# Patient Record
Sex: FEMALE | Race: WHITE | URBAN - METROPOLITAN AREA
[De-identification: names, ages, dates, MRNs, and addresses within clinical notes are randomized per-mention and may not be internally consistent; named-entity substitution may affect disease eponyms.]

---

## 2018-10-25 ENCOUNTER — RADIANT APPOINTMENT (OUTPATIENT)
Dept: GENERAL RADIOLOGY | Facility: CLINIC | Age: 33
End: 2018-10-25
Attending: PHYSICIAN ASSISTANT

## 2018-10-25 ENCOUNTER — OFFICE VISIT (OUTPATIENT)
Dept: URGENT CARE | Facility: URGENT CARE | Age: 33
End: 2018-10-25

## 2018-10-25 VITALS
OXYGEN SATURATION: 98 % | HEART RATE: 109 BPM | WEIGHT: 119 LBS | DIASTOLIC BLOOD PRESSURE: 80 MMHG | TEMPERATURE: 101 F | SYSTOLIC BLOOD PRESSURE: 129 MMHG

## 2018-10-25 DIAGNOSIS — J18.9 COMMUNITY ACQUIRED PNEUMONIA OF LEFT LOWER LOBE OF LUNG: Primary | ICD-10-CM

## 2018-10-25 DIAGNOSIS — R50.9 FEVER, UNSPECIFIED FEVER CAUSE: ICD-10-CM

## 2018-10-25 LAB
FLUAV+FLUBV AG SPEC QL: NEGATIVE
FLUAV+FLUBV AG SPEC QL: NEGATIVE
SPECIMEN SOURCE: NORMAL

## 2018-10-25 PROCEDURE — 99203 OFFICE O/P NEW LOW 30 MIN: CPT | Performed by: PHYSICIAN ASSISTANT

## 2018-10-25 PROCEDURE — 71046 X-RAY EXAM CHEST 2 VIEWS: CPT | Mod: FY

## 2018-10-25 PROCEDURE — 87804 INFLUENZA ASSAY W/OPTIC: CPT | Performed by: PHYSICIAN ASSISTANT

## 2018-10-25 RX ORDER — AZITHROMYCIN 250 MG/1
TABLET, FILM COATED ORAL
Qty: 6 TABLET | Refills: 0 | Status: SHIPPED | OUTPATIENT
Start: 2018-10-25

## 2018-10-25 RX ORDER — ALBUTEROL SULFATE 90 UG/1
2 AEROSOL, METERED RESPIRATORY (INHALATION) EVERY 4 HOURS PRN
Qty: 1 INHALER | Refills: 0 | Status: SHIPPED | OUTPATIENT
Start: 2018-10-25

## 2018-10-25 NOTE — PROGRESS NOTES
"SUBJECTIVE:   Shannan Amanda is a 33 year old female presenting with a chief complaint of   Chief Complaint   Patient presents with     Fever     Fever 101-103 axillary x 5 days.  Cough, body aches, fatigue x 3 days.    .    URI Adult    Onset of symptoms was 5 day(s) ago.  Course of illness is same.    Severity moderately severe  Current and Associated symptoms: fever, cough - non-productive, cough - productive, headache and body aches. No nasal congestion or stuffy nose. Admits to mild sore throat. No nausea, vomiting. No dysuria, hematuria. No rashes.   States malaise is improving but cough is worsening. No wheezing or SOB, but \"feels like she has to take slow quick breaths because it makes her cough more.\"  Treatment measures tried include Tylenol/Ibuprofen and OTC Cough med.  Predisposing factors include ill contact: Family member .      ROS  See HPI    PMH:  Past Medical History:   Diagnosis Date     NO ACTIVE PROBLEMS      There is no problem list on file for this patient.      Current Medications:  Current Outpatient Prescriptions   Medication Sig Dispense Refill     albuterol (VENTOLIN HFA) 108 (90 Base) MCG/ACT inhaler Inhale 2 puffs into the lungs every 4 hours as needed for shortness of breath / dyspnea or wheezing 1 Inhaler 0     azithromycin (ZITHROMAX) 250 MG tablet Two tablets first day, then one tablet daily for four days. 6 tablet 0       Surgical History:  No past surgical history on file.    Family History:  No family history on file.    Social History:  Social History   Substance Use Topics     Smoking status: Not on file     Smokeless tobacco: Not on file     Alcohol use Not on file          OBJECTIVE  /80  Pulse 109  Temp 101  F (38.3  C) (Tympanic)  Wt 119 lb (54 kg)  SpO2 98%    General: alert, appears generally well, NAD. Febrile.  Skin: skin is damp, clammy.  HEENT: Normocephalic.   Eyes: conjunctiva clear.   Ears: TMs pearly, translucent bilaterally.   Nose: mild erythema " without edema of nasal mucosa. No rhinorrhea.  Oropharynx: MMM. Geographic tongue. + posterior pharyngeal erythema, no petechiae or exudate. No tonsillar hypertrophy. Uvula midline.    Neck: supple, no lymphadenopathy.  Respiratory: No distress. Frequent dry coughing when asked to inspire deeply. Coarse breath sounds throughout. No wheezing.   Cardiovascular: mildly tachycardic but normal S1/S2. No murmurs, clicks, gallups, or rub. No peripheral edema. Peripheral pulses 2+ bilaterally.  Gastrointestinal: Abdomen soft, nontender, BS present. No masses, organomegaly.        Labs:  Results for orders placed or performed in visit on 10/25/18 (from the past 24 hour(s))   Influenza A/B antigen   Result Value Ref Range    Influenza A/B Agn Specimen Nasal     Influenza A Negative NEG^Negative    Influenza B Negative NEG^Negative         X-Ray was done, my findings are: LLL infiltrate      ASSESSMENT/PLAN:    ICD-10-CM    1. Community acquired pneumonia of left lower lobe of lung (H) J18.1 azithromycin (ZITHROMAX) 250 MG tablet     albuterol (VENTOLIN HFA) 108 (90 Base) MCG/ACT inhaler   2. Fever, unspecified fever cause R50.9 Influenza A/B antigen     XR Chest 2 Views           Medical Decision Making:    Serious Comorbid Conditions: none    Differential Diagnosis:   -influenza  -bronchitis  -pneumonia    CXR showed LLL infiltrate per my read; radiology read pending. Influenza test negative.    Will treat for CAP with azithromycin. Also prescribed albuterol inhaler for bronchial inflammation/bronchospasm, which she was clearly having upon my exam of her today.  No hypoxia or respiratory distress, appears generally well. No serious comorbidities. Patient is appropriate for outpatient management of pneumonia.  Discussed contagiousness of pneumonia, expected course.  Recommend f/up with PCP in 1 week for a recheck.  Recommend f/up sooner if no improvement in symptoms.      At the end of the encounter, I discussed all available  results, as well as the diagnosis and any associated medications. I discussed red flags for immediate return to clinic/ER, as well as indications for follow up. Refer to patient instructions below, which were all addressed with patient. Patient understood and agreed to plan. Patient was appropriate for discharge.      Patient Instructions     Your influenza test was negative.  Your chest xray showed an area suspicious for pneumonia.  We will treat the pneumonia with antibiotics.   Azithromycin has been prescribed. Take daily x 5 days as prescribed.  I have also prescribed an albuterol inhaler- may use 2 puffs every 4-6 hours as needed for coughing spasms.    Follow up with your primary care provider in 1-2 weeks for a recheck.  Follow up sooner if worsening symptoms.  Go to the ER if severe coughing, shaking chills, high fevers not coming down, shortness of breath, coughing up blood, or any other new, concerning symptoms.      Treating Pneumonia  Pneumonia is an infection of one or both of the lungs. Pneumonia:    Is usually caused by either a virus or a bacteria    Can be very serious, especially in infants, young children, and older adults. It s also serious for those with other long-term health problems or weakened immune systems.    Is sometimes treated at home and sometimes in the hospital    Antibiotic medicines  Antibiotics may be prescribed for pneumonia caused by bacteria. They may be pills (oral medicines), or shots (injections). Or they may be given by IV (intravenously) into a vein. If you are taking oral medicines at home:    Fill your prescription and start taking your medicine as soon as you can.    You will likely start to feel better in a day or 2, but don t stop taking the antibiotic.    Use a pill organizer to help you remember to take your medicine.    Let your healthcare provider know if you have side effects.    Take your medicine exactly as directed on the label. Talk to your provider or  pharmacist if you have any questions.  Antiviral medicines  Antiviral medicine may be prescribed for pneumonia caused by a virus. For example, antiviral medicine may be prescribed for pneumonia caused by the flu virus. Antibiotics do not work against viruses. If you are taking antiviral medicine at home:    Fill your prescription and start taking your medicine as soon as you can.    Talk with your provider or pharmacist about possible side effects.    Take the medicine exactly as instructed.  To relieve symptoms  There are many medicines that can help relieve symptoms of pneumonia. Some are prescription and some are over-the-counter.  Your healthcare provider may recommend:    Acetaminophen or ibuprofen to lower your fever and to lessen headache or other pain    Cough medicine to loosen mucus or to reduce coughing  Make sure you check with your healthcare provider or pharmacist before taking any over-the-counter medicines.  Special treatments  If you are hospitalized for pneumonia, you may have other therapies, including:    Inhaled medicines to help with breathing or chest congestion    Supplemental oxygen to increase low oxygen levels  Drink fluids and eat healthy  You should eat healthy to help your body fight the infection. Drinking a lot of fluids helps to replace fluids lost from fever and to loosen mucus in your chest.    Diet. Make healthy food choices, including fruits and vegetables, lean meats and other proteins, 100% whole grain and low- or no-fat dairy products.    Fluids. Drink at least 6 to 8 tall glasses a day. Water and 100% fruit or vegetable juice are best.  Get plenty of rest and sleep  You may be more tired than usual for a while. It is important to get enough sleep at night. It s also important to rest during the day. Talk with your healthcare provider if coughing or other symptoms are interfering with your sleep.  Preventing the spread of germs  The best thing you can do to prevent spreading  germs is to wash your hands often. You should:    Rub your hand with soap and water for 20 to 30 seconds.    Clean in between your fingers, the backs of your hands, and around your nails.    Dry your hands on a separate towel or use paper towels.  You should also:    Keep alcohol-based hand  nearby.    Make sure you also clean surfaces that you touch. Use a product that kills all types of germs.    Stay away from others until you are feeling better.  When to call your healthcare provider  Call your healthcare provider if you have any of the following:    Symptoms get worse    Fever continues    Shortness of breath gets worse    Increased mucus or mucus that is darker in color    Coughing gets worse    Lips or fingers are bluish in color    Side effects from your medicine   Date Last Reviewed: 12/1/2016 2000-2017 [employer]. [address] [place] [address]. All rights reserved. This information is not intended as a substitute for professional medical care. Always follow your healthcare professional's instructions.                    Sue Moreland PA-C

## 2018-10-25 NOTE — MR AVS SNAPSHOT
After Visit Summary   10/25/2018    hSannan Amanda    MRN: [id number]           Patient Information     Date Of Birth          1985        Visit Information        Provider Department      10/25/2018 5:30 PM Sue Moreland PA-C [facility]        Today's Diagnoses     Fever, unspecified fever cause    -  1    Community acquired pneumonia of left lower lobe of lung (H)          Care Instructions    Your influenza test was negative.  Your chest xray showed an area suspicious for pneumonia.  We will treat the pneumonia with antibiotics.   Azithromycin has been prescribed. Take daily x 5 days as prescribed.  I have also prescribed an albuterol inhaler- may use 2 puffs every 4-6 hours as needed for coughing spasms.    Follow up with your primary care provider in 1-2 weeks for a recheck.  Follow up sooner if worsening symptoms.  Go to the ER if severe coughing, shaking chills, high fevers not coming down, shortness of breath, coughing up blood, or any other new, concerning symptoms.      Treating Pneumonia  Pneumonia is an infection of one or both of the lungs. Pneumonia:    Is usually caused by either a virus or a bacteria    Can be very serious, especially in infants, young children, and older adults. It s also serious for those with other long-term health problems or weakened immune systems.    Is sometimes treated at home and sometimes in the hospital    Antibiotic medicines  Antibiotics may be prescribed for pneumonia caused by bacteria. They may be pills (oral medicines), or shots (injections). Or they may be given by IV (intravenously) into a vein. If you are taking oral medicines at home:    Fill your prescription and start taking your medicine as soon as you can.    You will likely start to feel better in a day or 2, but don t stop taking the antibiotic.    Use a pill organizer to help you remember to take your medicine.    Let your healthcare provider know if you have  side effects.    Take your medicine exactly as directed on the label. Talk to your provider or pharmacist if you have any questions.  Antiviral medicines  Antiviral medicine may be prescribed for pneumonia caused by a virus. For example, antiviral medicine may be prescribed for pneumonia caused by the flu virus. Antibiotics do not work against viruses. If you are taking antiviral medicine at home:    Fill your prescription and start taking your medicine as soon as you can.    Talk with your provider or pharmacist about possible side effects.    Take the medicine exactly as instructed.  To relieve symptoms  There are many medicines that can help relieve symptoms of pneumonia. Some are prescription and some are over-the-counter.  Your healthcare provider may recommend:    Acetaminophen or ibuprofen to lower your fever and to lessen headache or other pain    Cough medicine to loosen mucus or to reduce coughing  Make sure you check with your healthcare provider or pharmacist before taking any over-the-counter medicines.  Special treatments  If you are hospitalized for pneumonia, you may have other therapies, including:    Inhaled medicines to help with breathing or chest congestion    Supplemental oxygen to increase low oxygen levels  Drink fluids and eat healthy  You should eat healthy to help your body fight the infection. Drinking a lot of fluids helps to replace fluids lost from fever and to loosen mucus in your chest.    Diet. Make healthy food choices, including fruits and vegetables, lean meats and other proteins, 100% whole grain and low- or no-fat dairy products.    Fluids. Drink at least 6 to 8 tall glasses a day. Water and 100% fruit or vegetable juice are best.  Get plenty of rest and sleep  You may be more tired than usual for a while. It is important to get enough sleep at night. It s also important to rest during the day. Talk with your healthcare provider if coughing or other symptoms are interfering  with your sleep.  Preventing the spread of germs  The best thing you can do to prevent spreading germs is to wash your hands often. You should:    Rub your hand with soap and water for 20 to 30 seconds.    Clean in between your fingers, the backs of your hands, and around your nails.    Dry your hands on a separate towel or use paper towels.  You should also:    Keep alcohol-based hand  nearby.    Make sure you also clean surfaces that you touch. Use a product that kills all types of germs.    Stay away from others until you are feeling better.  When to call your healthcare provider  Call your healthcare provider if you have any of the following:    Symptoms get worse    Fever continues    Shortness of breath gets worse    Increased mucus or mucus that is darker in color    Coughing gets worse    Lips or fingers are bluish in color    Side effects from your medicine   Date Last Reviewed: 12/1/2016 2000-2017 [employer]. [address] [place] [address]. All rights reserved. This information is not intended as a substitute for professional medical care. Always follow your healthcare professional's instructions.                Follow-ups after your visit        Follow-up notes from your care team     Return if symptoms worsen or fail to improve.      Who to contact     If you have questions or need follow up information about today's clinic visit or your schedule please contact [facility] directly at [phone].  Normal or non-critical lab and imaging results will be communicated to you by MyChart, letter or phone within 4 business days after the clinic has received the results. If you do not hear from us within 7 days, please contact the clinic through MyChart or phone. If you have a critical or abnormal lab result, we will notify you by phone as soon as possible.  Submit refill requests through [employer] or call your pharmacy and they will forward the refill request to  us. Please allow 3 business days for your refill to be completed.          Additional Information About Your Visit        Care EveryWhere ID     This is your Care EveryWhere ID. This could be used by other organizations to access your [place] medical records  [id number]        Your Vitals Were     Pulse Temperature Pulse Oximetry             109 101  F (38.3  C) (Tympanic) 98%          Blood Pressure from Last 3 Encounters:   10/25/18 129/80    Weight from Last 3 Encounters:   10/25/18 119 lb (54 kg)              We Performed the Following     Influenza A/B antigen     XR Chest 2 Views          Today's Medication Changes          These changes are accurate as of 10/25/18  7:12 PM.  If you have any questions, ask your nurse or doctor.               Start taking these medicines.        Dose/Directions    albuterol 108 (90 Base) MCG/ACT inhaler   Commonly known as:  VENTOLIN HFA   Used for:  Community acquired pneumonia of left lower lobe of lung (H)   Started by:  Sue Moreland PA-C        Dose:  2 puff   Inhale 2 puffs into the lungs every 4 hours as needed for shortness of breath / dyspnea or wheezing   Quantity:  1 Inhaler   Refills:  0       azithromycin 250 MG tablet   Commonly known as:  ZITHROMAX   Used for:  Community acquired pneumonia of left lower lobe of lung (H)   Started by:  Sue Moreland PA-C        Two tablets first day, then one tablet daily for four days.   Quantity:  6 tablet   Refills:  0            Where to get your medicines      These medications were sent to [id number] [place] [address] AT [address] [place], [place] [address]     Phone:  [phone]     albuterol 108 (90 Base) MCG/ACT inhaler    azithromycin 250 MG tablet                Primary Care Provider Office Phone # Fax #    [facility] [phone] [id number] [address]  [facility] [address] [address]  Access to Services     [facility]: Hadii aad ku hadediemare Dukeali, wachuchoda luqadaha, qaybta kaandrewjennifer farrell. So [facility] [phone].    ATENCIÓN: Si habla español, tiene a mccord disposición servicios gratuitos de asistencia lingüística. Llame al [phone].    We comply with applicable federal civil rights laws and [place] laws. We do not discriminate on the basis of race, color, national origin, age, disability, sex, sexual orientation, or gender identity.            Thank you!     Thank you for choosing [facility] [place] [facility]  [facility] your [facility]. Our goal is always to provide you with excellent care. Hearing back from our patients is one way we can continue to improve our services. Please take a few minutes to complete the written survey that you may receive in the mail after your visit with us. Thank you!             Your Updated Medication List - Protect others around you: Learn how to safely use, store and throw away your medicines at www.disposemymeds.org.          This list is accurate as of 10/25/18  7:12 PM.  Always use your most recent med list.                   Brand Name Dispense Instructions for use Diagnosis    albuterol 108 (90 Base) MCG/ACT inhaler    VENTOLIN HFA    1 Inhaler    Inhale 2 puffs into the lungs every 4 hours as needed for shortness of breath / dyspnea or wheezing    Community acquired pneumonia of left lower lobe of lung (H)       azithromycin 250 MG tablet    ZITHROMAX    6 tablet    Two tablets first day, then one tablet daily for four days.    Community acquired pneumonia of left lower lobe of lung (H)

## 2018-10-26 NOTE — PATIENT INSTRUCTIONS
Your influenza test was negative.  Your chest xray showed an area suspicious for pneumonia.  We will treat the pneumonia with antibiotics.   Azithromycin has been prescribed. Take daily x 5 days as prescribed.  I have also prescribed an albuterol inhaler- may use 2 puffs every 4-6 hours as needed for coughing spasms.    Follow up with your primary care provider in 1-2 weeks for a recheck.  Follow up sooner if worsening symptoms.  Go to the ER if severe coughing, shaking chills, high fevers not coming down, shortness of breath, coughing up blood, or any other new, concerning symptoms.      Treating Pneumonia  Pneumonia is an infection of one or both of the lungs. Pneumonia:    Is usually caused by either a virus or a bacteria    Can be very serious, especially in infants, young children, and older adults. It s also serious for those with other long-term health problems or weakened immune systems.    Is sometimes treated at home and sometimes in the hospital    Antibiotic medicines  Antibiotics may be prescribed for pneumonia caused by bacteria. They may be pills (oral medicines), or shots (injections). Or they may be given by IV (intravenously) into a vein. If you are taking oral medicines at home:    Fill your prescription and start taking your medicine as soon as you can.    You will likely start to feel better in a day or 2, but don t stop taking the antibiotic.    Use a pill organizer to help you remember to take your medicine.    Let your healthcare provider know if you have side effects.    Take your medicine exactly as directed on the label. Talk to your provider or pharmacist if you have any questions.  Antiviral medicines  Antiviral medicine may be prescribed for pneumonia caused by a virus. For example, antiviral medicine may be prescribed for pneumonia caused by the flu virus. Antibiotics do not work against viruses. If you are taking antiviral medicine at home:    Fill your prescription and start taking  your medicine as soon as you can.    Talk with your provider or pharmacist about possible side effects.    Take the medicine exactly as instructed.  To relieve symptoms  There are many medicines that can help relieve symptoms of pneumonia. Some are prescription and some are over-the-counter.  Your healthcare provider may recommend:    Acetaminophen or ibuprofen to lower your fever and to lessen headache or other pain    Cough medicine to loosen mucus or to reduce coughing  Make sure you check with your healthcare provider or pharmacist before taking any over-the-counter medicines.  Special treatments  If you are hospitalized for pneumonia, you may have other therapies, including:    Inhaled medicines to help with breathing or chest congestion    Supplemental oxygen to increase low oxygen levels  Drink fluids and eat healthy  You should eat healthy to help your body fight the infection. Drinking a lot of fluids helps to replace fluids lost from fever and to loosen mucus in your chest.    Diet. Make healthy food choices, including fruits and vegetables, lean meats and other proteins, 100% whole grain and low- or no-fat dairy products.    Fluids. Drink at least 6 to 8 tall glasses a day. Water and 100% fruit or vegetable juice are best.  Get plenty of rest and sleep  You may be more tired than usual for a while. It is important to get enough sleep at night. It s also important to rest during the day. Talk with your healthcare provider if coughing or other symptoms are interfering with your sleep.  Preventing the spread of germs  The best thing you can do to prevent spreading germs is to wash your hands often. You should:    Rub your hand with soap and water for 20 to 30 seconds.    Clean in between your fingers, the backs of your hands, and around your nails.    Dry your hands on a separate towel or use paper towels.  You should also:    Keep alcohol-based hand  nearby.    Make sure you also clean surfaces that  you touch. Use a product that kills all types of germs.    Stay away from others until you are feeling better.  When to call your healthcare provider  Call your healthcare provider if you have any of the following:    Symptoms get worse    Fever continues    Shortness of breath gets worse    Increased mucus or mucus that is darker in color    Coughing gets worse    Lips or fingers are bluish in color    Side effects from your medicine   Date Last Reviewed: 12/1/2016 2000-2017 [employer]. [address] [place] [address]. All rights reserved. This information is not intended as a substitute for professional medical care. Always follow your healthcare professional's instructions.